# Patient Record
Sex: MALE | URBAN - METROPOLITAN AREA
[De-identification: names, ages, dates, MRNs, and addresses within clinical notes are randomized per-mention and may not be internally consistent; named-entity substitution may affect disease eponyms.]

---

## 2024-08-05 ENCOUNTER — TELEPHONE (OUTPATIENT)
Age: 64
End: 2024-08-05

## 2024-08-05 NOTE — TELEPHONE ENCOUNTER
Spoke to Mr. Menjivar regarding voicemail he left requesting an appointment.  Informed Mr. Menjivar he's already scheduled for Tuesday, August 13, 2024 at 10:15am at our [place] office.  I explained we have available appointments on Thursday, August 8, 2024 at our  office in [place].  Mr. Menjivar declined an appointment at  office location and states he'll keep the appointment at our [place] location as scheduled and he'll call his insurance to arrange transportation.

## 2024-08-13 ENCOUNTER — OFFICE VISIT (OUTPATIENT)
Age: 64
End: 2024-08-13
Payer: COMMERCIAL

## 2024-08-13 VITALS
BODY MASS INDEX: 18.1 KG/M2 | OXYGEN SATURATION: 96 % | SYSTOLIC BLOOD PRESSURE: 117 MMHG | HEART RATE: 87 BPM | DIASTOLIC BLOOD PRESSURE: 77 MMHG | WEIGHT: 106 LBS | HEIGHT: 64 IN

## 2024-08-13 DIAGNOSIS — K40.90 LEFT INGUINAL HERNIA: ICD-10-CM

## 2024-08-13 DIAGNOSIS — K40.90 RIGHT INGUINAL HERNIA: Primary | ICD-10-CM

## 2024-08-13 PROCEDURE — 99204 OFFICE O/P NEW MOD 45 MIN: CPT | Performed by: SURGERY

## 2024-08-13 RX ORDER — QUETIAPINE FUMARATE 200 MG/1
200 TABLET, FILM COATED ORAL
COMMUNITY
Start: 2024-07-25 | End: 2024-08-24

## 2024-08-13 RX ORDER — METHYLPREDNISOLONE 4 MG/1
4 TABLET ORAL SEE ADMIN INSTRUCTIONS
COMMUNITY
Start: 2024-08-01

## 2024-08-13 RX ORDER — BACLOFEN 10 MG/1
10 TABLET ORAL PRN
COMMUNITY
Start: 2024-08-01

## 2024-08-13 RX ORDER — DULOXETIN HYDROCHLORIDE 60 MG/1
60 CAPSULE, DELAYED RELEASE ORAL 2 TIMES DAILY
COMMUNITY
Start: 2024-07-25

## 2024-08-13 RX ORDER — AZITHROMYCIN 250 MG/1
250 TABLET, FILM COATED ORAL DAILY
COMMUNITY
Start: 2024-08-01

## 2024-08-13 RX ORDER — FLUTICASONE PROPIONATE AND SALMETEROL 100; 50 UG/1; UG/1
1 POWDER RESPIRATORY (INHALATION) 2 TIMES DAILY
COMMUNITY
Start: 2024-08-01

## 2024-08-13 RX ORDER — MELOXICAM 7.5 MG/1
7.5 TABLET ORAL DAILY
COMMUNITY
Start: 2024-08-01

## 2024-08-13 RX ORDER — BUSPIRONE HYDROCHLORIDE 15 MG/1
15 TABLET ORAL 2 TIMES DAILY
COMMUNITY
Start: 2024-07-25 | End: 2024-08-24

## 2024-08-13 RX ORDER — ALBUTEROL SULFATE 90 UG/1
2 AEROSOL, METERED RESPIRATORY (INHALATION) EVERY 4 HOURS PRN
COMMUNITY
Start: 2024-08-01

## 2024-08-13 NOTE — PROGRESS NOTES
General Surgery Consult      James Menjivar  Admit date: (Not on file)    MRN: [id number]     : 1960     Age: 64 y.o.        Attending Physician: Toño Elias MD, [facility]      History of Present Illness:     James Menjivar is a 64 y.o. male who has multiple medical problems and recently he was admitted for suicidal thoughts and he also has a history of cocaine abuse who is presenting with bilateral inguinal hernias.  The patient seems to be very cachectic and not in great condition and he is using a walker and states that he has chronic bilateral hip and back pain especially the right hip which she had surgery on.  He stated that he was told that he needs another surgery on the right hip and his pain is localized in the right hip area and it radiates to the groin area and the thigh.  He has been noticing 2 bulges for couple months now and both of them are reducible.    There are no problems to display for this patient.    No past medical history on file.   No past surgical history on file.   Social History     Tobacco Use    Smoking status: Not on file    Smokeless tobacco: Not on file   Substance Use Topics    Alcohol use: Not on file      Social History     Tobacco Use   Smoking Status Not on file   Smokeless Tobacco Not on file     No family history on file.   No current outpatient medications on file.     No current facility-administered medications for this visit.      Allergies   Allergen Reactions    Penicillins Hives and Other (See Comments)     All  cillins        Review of Systems:  Pertinent items are noted in the History of Present Illness.    Objective:     Ht 1.626 m (5' 4\")   Wt 48.1 kg (106 lb)   BMI 18.19 kg/m²     Physical Exam:      General:  in no apparent distress   Eyes:  conjunctivae and sclerae normal, pupils equal, round, reactive to light   Throat & Neck: normal, no erythema or exudates noted. , and no palpable masses   Lungs:   clear to auscultation bilaterally   Heart:  Regular

## 2024-08-13 NOTE — PROGRESS NOTES
James Menjivar is a 64 y.o. male (: 1960) presenting to address:    Chief Complaint   Patient presents with    New Patient     Bilateral inguinal hernia/referred by Dr. Dylon Ramirez       Medication list and allergies have been reviewed with James Menjivar and updated as of today's date.     I have gone over all Medical, Surgical and Social History with James Otto and updated/added the information accordingly.

## 2024-08-14 ENCOUNTER — TELEPHONE (OUTPATIENT)
Age: 64
End: 2024-08-14

## 2024-08-14 NOTE — TELEPHONE ENCOUNTER
Left voicemail message for Mr. Menjivar to call me to inform I spoke to Dr. Elias regarding his phone call earlier inquiring about scheduling surgery.  Dr. Elias strongly advise Mr. Menjivar to follow up with PCP/get appointment with orthopedic.  Dr. Elias is not recommending hernia surgery at this time as discussed during office visit yesterday.  Will assist Mr. Menjivar to obtain appointments with PCP/orthopedic surgeon if need.  Will await Mr. Menjivar's return call.

## 2024-08-14 NOTE — TELEPHONE ENCOUNTER
Spoke to Mr. Menjivar upon receipt of his voicemail message.  I explained to Mr. Menjivar Dr. Elias made an attempt to contact him and I spoke to Dr. Elias about his office visit yesterday and his request to schedule hernia repair.  I explained to Mr. Menjivar Dr. Elias decision as discussed yesterday during his office visit is surgery is not recommended at this time due to his current condition/fit.  Mr. Menjivar states he has a hernia and it hurts.  I explained to Mr. Menjivar his hernia is reducible, his condition is not of emergent status and Dr. Elias has indicate the pain appear to be more related to the chronic hip pain and therefore that's why the recommendation was provided for him to contact his PCP re: orthopedic evaluation.  Mr. Menjivar states he don't know why he can't have his surgery in [place] with Dr. Elias.  I told Mr. Menjivar again Dr. Elias is not going to perform his hernia repair surgery.  Additionally Dr. Elias only perform surgeries at [facility] and he'll need to obtain a referral from his PCP for a [facility] provider. Mr. Menjivar states he should have a right to say he want surgery.  I explained to Mr. Menjivar the concerned Dr. Elias has expressed is for his well being as undergoing surgery is a stress upon the body and he appeared not physically well to have surgery.  So again he need to see his PCP and discuss referral to [facility] provider.  Mr. Menjivar states he's going to get the hernia repaired even if he has to call the head of the hospital.  I offered to assist Mr. Menjivar by calling his PCP to request a [facility] provider on his behalf. In the meantime I suggested to Mr. Menjivar to take the time to focus on healthy choices/better health for himself while the request for his PCP to another surgeon is being processed.  Mr. Menjivar verbalized understanding.

## 2024-08-14 NOTE — TELEPHONE ENCOUNTER
Left message for Dr. Ramirez nurse regarding Mr. Menjivar's request for a referral to a [facility] surgeon in [place].

## 2024-08-14 NOTE — TELEPHONE ENCOUNTER
Mr. Menjivar called stating he was seen yesterday by Dr. Elias and he got upset because Dr. Elias told him he didn't recommend him to have the hernia surgery.  Mr. Menjivar states he was advised to proceed to see his PCP/orthopedic surgeon for an evaluation of his chronic hip pain.  Mr. Menjivar said he's had some time to think about his visit yesterday and he would like to have the hernia surgery because it would be a quicker recovery versus having the hip surgery.  I informed Mr. Menjivar Dr. Elias is in surgery today however I'll relay a message.  Mr. Menjivar asked why his surgery can't be performed at a [facility] facility and I explained we're [facility] providers and if he desire to have his surgery at a [facility] facility he may discuss with his PCP to obtain another referral.  I told Mr. Menjivar Dr. Elias perform surgeries at [facility] only.  Mr. Menjivar states the hernia hurts.  I explained to Mr. Menjivar per review of Dr. Elias office note there's a concerned about his fit for surgery and the pain appear not to be related to the hernia but hip instead.  Additionally Dr. Elias indicated he will not perform the surgery currently and made a recommendation that he contact his PCP to get an orthopedic evaluation and follow up in 3 months for re-evaluation of the hernia.